# Patient Record
Sex: MALE | Race: WHITE | NOT HISPANIC OR LATINO | Employment: FULL TIME | URBAN - METROPOLITAN AREA
[De-identification: names, ages, dates, MRNs, and addresses within clinical notes are randomized per-mention and may not be internally consistent; named-entity substitution may affect disease eponyms.]

---

## 2022-08-05 ENCOUNTER — HOSPITAL ENCOUNTER (EMERGENCY)
Facility: HOSPITAL | Age: 37
Discharge: HOME | End: 2022-08-05
Attending: EMERGENCY MEDICINE
Payer: COMMERCIAL

## 2022-08-05 VITALS
HEART RATE: 81 BPM | DIASTOLIC BLOOD PRESSURE: 85 MMHG | SYSTOLIC BLOOD PRESSURE: 132 MMHG | OXYGEN SATURATION: 97 % | RESPIRATION RATE: 18 BRPM | TEMPERATURE: 98 F

## 2022-08-05 DIAGNOSIS — S63.502A SPRAIN OF LEFT WRIST, INITIAL ENCOUNTER: Primary | ICD-10-CM

## 2022-08-05 DIAGNOSIS — M25.539 WRIST PAIN: ICD-10-CM

## 2022-08-05 PROCEDURE — 25000003 PHARM REV CODE 250: Performed by: PHYSICIAN ASSISTANT

## 2022-08-05 PROCEDURE — 99284 EMERGENCY DEPT VISIT MOD MDM: CPT

## 2022-08-05 PROCEDURE — 99284 PR EMERGENCY DEPT VISIT,LEVEL IV: ICD-10-PCS | Mod: ,,, | Performed by: PHYSICIAN ASSISTANT

## 2022-08-05 PROCEDURE — 99284 EMERGENCY DEPT VISIT MOD MDM: CPT | Mod: ,,, | Performed by: PHYSICIAN ASSISTANT

## 2022-08-05 RX ORDER — KETOROLAC TROMETHAMINE 30 MG/ML
15 INJECTION, SOLUTION INTRAMUSCULAR; INTRAVENOUS
Status: DISCONTINUED | OUTPATIENT
Start: 2022-08-05 | End: 2022-08-06 | Stop reason: HOSPADM

## 2022-08-05 RX ORDER — NAPROXEN 500 MG/1
500 TABLET ORAL 2 TIMES DAILY PRN
Qty: 30 TABLET | Refills: 0 | Status: SHIPPED | OUTPATIENT
Start: 2022-08-05 | End: 2022-08-10 | Stop reason: SDUPTHER

## 2022-08-05 RX ORDER — ACETAMINOPHEN 500 MG
1000 TABLET ORAL
Status: COMPLETED | OUTPATIENT
Start: 2022-08-05 | End: 2022-08-05

## 2022-08-05 RX ORDER — IBUPROFEN 600 MG/1
600 TABLET ORAL
Status: DISCONTINUED | OUTPATIENT
Start: 2022-08-05 | End: 2022-08-06 | Stop reason: HOSPADM

## 2022-08-05 RX ORDER — KETOROLAC TROMETHAMINE 30 MG/ML
15 INJECTION, SOLUTION INTRAMUSCULAR; INTRAVENOUS
Status: DISCONTINUED | OUTPATIENT
Start: 2022-08-05 | End: 2022-08-05

## 2022-08-05 RX ORDER — HYDROCODONE BITARTRATE AND ACETAMINOPHEN 5; 325 MG/1; MG/1
1 TABLET ORAL
Status: COMPLETED | OUTPATIENT
Start: 2022-08-05 | End: 2022-08-05

## 2022-08-05 RX ADMIN — HYDROCODONE BITARTRATE AND ACETAMINOPHEN 1 TABLET: 5; 325 TABLET ORAL at 10:08

## 2022-08-05 RX ADMIN — ACETAMINOPHEN 1000 MG: 500 TABLET ORAL at 09:08

## 2022-08-05 NOTE — Clinical Note
"Oscar"Clem Tobar was seen and treated in our emergency department on 8/5/2022.  He may return to work on 08/10/2022.  Mr. Tobar likely has an injury to ligaments and tendons in his wrist.  You will need to be evaluated by an orthopedic surgeon to be cleared to return to work.     If you have any questions or concerns, please don't hesitate to call.      Dinorah Montero PA-C"

## 2022-08-06 NOTE — DISCHARGE INSTRUCTIONS
Diagnosis:  Wrist pain    Your x-rays did not show any broken bones.  I think that you may have a tendon or ligament injury given her significant difficulty moving the wrist.  You were given a wrist splint in the emergency department.  You should rest, ice, compress and elevate your wrist.  I am prescribing an anti-inflammatory medicine that you can take as needed for pain. You should take Tylenol in addition to this as needed for pain up to 3 grams daily which is 6 of the 500 mg extra strength tablets.    I sent a referral to our orthopedic surgery doctors for follow-up.  Please call to schedule follow-up appointment.  You will need to be cleared to return to work by an orthopedist or by your primary care doctor.  Insert have any worsening symptoms, please return to the emergency department.

## 2022-08-06 NOTE — ED TRIAGE NOTES
Pt here for wrist injury. Pt stated he was at work when he felt a pop in his wrist. Pt hasn't been able to move wrist since. Able to move fingers.

## 2022-08-09 ENCOUNTER — TELEPHONE (OUTPATIENT)
Dept: ORTHOPEDICS | Facility: CLINIC | Age: 37
End: 2022-08-09

## 2022-08-09 NOTE — TELEPHONE ENCOUNTER
Called pt verify coverage pt states he's Workers comp and was referred to the hand clinic by the ED informed pt that he will have to see [employer] before being seen in clinic gave pt [employer] info notified my supervisor about pt.

## 2022-08-09 NOTE — TELEPHONE ENCOUNTER
Left voicemail regarding rescheduling of appointment with appropriate provider. Requested a call back if needed.

## 2022-08-10 ENCOUNTER — OFFICE VISIT (OUTPATIENT)
Dept: URGENT CARE | Facility: CLINIC | Age: 37
End: 2022-08-10
Payer: COMMERCIAL

## 2022-08-10 VITALS
OXYGEN SATURATION: 97 % | HEIGHT: 66 IN | SYSTOLIC BLOOD PRESSURE: 115 MMHG | TEMPERATURE: 99 F | DIASTOLIC BLOOD PRESSURE: 73 MMHG | HEART RATE: 70 BPM | WEIGHT: 151 LBS | BODY MASS INDEX: 24.27 KG/M2

## 2022-08-10 DIAGNOSIS — S63.502A LEFT WRIST SPRAIN, INITIAL ENCOUNTER: Primary | ICD-10-CM
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
## 2022-08-10 DIAGNOSIS — Z02.6 ENCOUNTER RELATED TO WORKER'S COMPENSATION CLAIM: ICD-10-CM

## 2022-08-10 PROCEDURE — 99203 PR OFFICE/OUTPT VISIT, NEW, LEVL III, 30-44 MIN: ICD-10-PCS | Mod: S$GLB,,, | Performed by: PHYSICIAN ASSISTANT

## 2022-08-10 PROCEDURE — 99203 OFFICE O/P NEW LOW 30 MIN: CPT | Mod: S$GLB,,, | Performed by: PHYSICIAN ASSISTANT

## 2022-08-10 RX ORDER — NAPROXEN 500 MG/1
500 TABLET ORAL 2 TIMES DAILY PRN
Qty: 30 TABLET | Refills: 0 | Status: SHIPPED | OUTPATIENT
Start: 2022-08-10

## 2022-08-10 RX ORDER — ACETAMINOPHEN 500 MG
1000 TABLET ORAL EVERY 6 HOURS PRN
Qty: 60 TABLET | Refills: 0 | Status: SHIPPED | OUTPATIENT
Start: 2022-08-10 | End: 2023-08-10

## 2022-08-10 NOTE — LETTER
[facility]  [address] [address]  Phone: [phone]  Fax: [phone]  [facility]: [id number]    Pt Name: Oscar Santoro Date: 08/05/2022   Employee ID: [id number] Date of First Treatment: 08/10/2022   Company: [employer]       Appointment Time: 02:30 PM Arrived: 2:16 PM   Provider: Sukhwinder Denis PA-C Time Out: 4:05 PM     Office Treatment:   1. Left wrist sprain, initial encounter    2. Encounter related to worker's compensation claim      Medications Ordered This Encounter   Medications    acetaminophen (TYLENOL EXTRA STRENGTH) 500 MG tablet    naproxen (NAPROSYN) 500 MG tablet      Patient Instructions: Use splint as directed, Apply ice 24-48 hours then apply heat/warm soaks, MRI to be scheduled once authorized      Restrictions: Home today (No use of left hand/arm. May begin restricted duty 8/12/2022)     Return Appointment: 8/16/2022 at 9:15 AM TERESSA

## 2022-08-10 NOTE — PROGRESS NOTES
Subjective:       Patient ID: Oscar Tobar is a 36 y.o. male.    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 On Friday he was asked to move a piece of Dure deck that was caked in mud. He normally is given  to move it but they were not available to him at the time of the injury. He tried to move the Duradeck and felt a pop in the Left wrist. He leaned down to do another one and was advised to see the medic. He was sent to [facility] and X-rays were taken. They determined he did not break anything and he was given a brace to wear. The patient has complaints of a throbbing sensation that is also sharp at times deep inside the wrist that travels up to the shoulder. He also complains it feels numbness in the middle two fingers. He was prescribed Naproxen but states it did not help He wears the brace daily. Current pain score 8/10. Patient is RHD. LRC       Constitution: Positive for activity change.   HENT: Negative.    Neck: neck negative.   Cardiovascular: Negative.    Eyes: Negative.    Respiratory: Negative.    Gastrointestinal: Negative.    Endocrine: negative.   Genitourinary: Negative.    Musculoskeletal: Positive for pain, joint pain, joint swelling, abnormal ROM of joint and muscle ache.   Skin: Negative.    Allergic/Immunologic: Negative.    Neurological: Positive for numbness and tingling.   Hematologic/Lymphatic: Negative.    Psychiatric/Behavioral: Negative.         Objective:      Physical Exam  Vitals and nursing note reviewed.   Constitutional:       General: He is not in acute distress.     Appearance: He is well-developed. He is not diaphoretic.   HENT:      Head: Normocephalic and atraumatic.      Right Ear: Hearing and external ear normal.      Left Ear: Hearing and external ear normal.      Nose: Nose normal. No nasal deformity.   Eyes:      General: Lids are normal. No scleral  icterus.     Conjunctiva/sclera: Conjunctivae normal.   Neck:      Trachea: Trachea normal.   Cardiovascular:      Pulses: Normal pulses.   Pulmonary:      Effort: Pulmonary effort is normal. No respiratory distress.      Breath sounds: No stridor.   Musculoskeletal:      Left shoulder: Tenderness (Anterior aspect) present. Decreased range of motion ( AROM:  Flexion 90°, abduction 80° unable to supinate arm to continue abduction range of motion due to wrist pain.). Normal pulse.      Left wrist: Swelling, tenderness (Dorsal>volar) and bony tenderness present. Decreased range of motion (  AROM flexion 0° extension 5°). Normal pulse.      Left hand: Tenderness present. Decreased range of motion. Decreased strength of finger abduction, thumb/finger opposition and wrist extension. Normal sensation. Normal capillary refill. Normal pulse.      Cervical back: Normal range of motion.   Skin:     General: Skin is warm and dry.      Capillary Refill: Capillary refill takes less than 2 seconds.   Neurological:      Mental Status: He is alert. He is not disoriented.      GCS: GCS eye subscore is 4. GCS verbal subscore is 5. GCS motor subscore is 6.      Sensory: No sensory deficit.   Psychiatric:         Attention and Perception: He is attentive.         Speech: Speech normal.         Behavior: Behavior normal.           X-Ray Wrist Complete Left    Result Date: 8/5/2022  EXAMINATION: XR HAND COMPLETE 3 VIEW LEFT; XR WRIST COMPLETE 3 VIEWS LEFT CLINICAL HISTORY: wrist pain; Pain in unspecified wrist TECHNIQUE: PA, lateral, and oblique views of the right hand and right wrist were performed. COMPARISON: None FINDINGS: No fracture or dislocation.  No periarticular osteopenia or erosion.  Cartilage spaces are maintained.  Soft tissues are unremarkable.     No acute fracture or dislocation in the right hand or wrist. Electronically signed by resident: Lexii Herrmann Date:    08/05/2022 Time:    22:08 Electronically signed by: Luis  Ruiz Date:    08/05/2022 Time:    22:18    X-Ray Hand 3 View Left    Result Date: 8/5/2022  EXAMINATION: XR HAND COMPLETE 3 VIEW LEFT; XR WRIST COMPLETE 3 VIEWS LEFT CLINICAL HISTORY: wrist pain; Pain in unspecified wrist TECHNIQUE: PA, lateral, and oblique views of the right hand and right wrist were performed. COMPARISON: None FINDINGS: No fracture or dislocation.  No periarticular osteopenia or erosion.  Cartilage spaces are maintained.  Soft tissues are unremarkable.     No acute fracture or dislocation in the right hand or wrist. Electronically signed by resident: Lexii Herrmann Date:    08/05/2022 Time:    22:08 Electronically signed by: Luis Melchor Date:    08/05/2022 Time:    22:18    Assessment:       1. Left wrist sprain, initial encounter    2. Encounter related to worker's compensation claim        Plan:         LEFT WRIST WITH SEVERE DEFICITS IN RANGE OF MOTION AND STRENGTH.  SWOLLEN AND TENDER.  PATIENT REPORTS FEELING A POPPING SENSATION WITH ANY TYPE OF MOVEMENT.  HE ALSO REPORTS NUMBNESS AND TINGLING IN THE MEDIAN NERVE DISTRIBUTION.  HE REPORTS PAIN RADIATES TO THE LEFT SHOULDER.  WITH THESE DEFICITS I FEEL IT IS NECESSARY TO GET AN MRI AS PATIENT HAS PROBABLE LIGAMENTOUS AND/OR TENDON INJURY.  HIS X-RAY SHOWED NO FRACTURES.  I WOULD NOT FEEL COMFORTABLE SENDING HIM TO OCCUPATIONAL THERAPY AT THIS TIME DUE TO HIS SEVERE PAIN AND DEFICITS.     Medications Ordered This Encounter   Medications    acetaminophen (TYLENOL EXTRA STRENGTH) 500 MG tablet     Sig: Take 2 tablets (1,000 mg total) by mouth every 6 (six) hours as needed for Pain.     Dispense:  60 tablet     Refill:  0    naproxen (NAPROSYN) 500 MG tablet     Sig: Take 1 tablet (500 mg total) by mouth 2 (two) times daily as needed (pain).     Dispense:  30 tablet     Refill:  0     Patient Instructions: Use splint as directed, Apply ice 24-48 hours then apply heat/warm soaks, MRI to be scheduled once authorized   Restrictions: Home  today (No use of left hand/arm. May begin restricted duty 8/12/2022)  Follow up in about 1 week (around 8/17/2022).

## 2022-08-16 ENCOUNTER — OFFICE VISIT (OUTPATIENT)
Dept: URGENT CARE | Facility: CLINIC | Age: 37
End: 2022-08-16
Payer: COMMERCIAL

## 2022-08-16 VITALS
BODY MASS INDEX: 24.27 KG/M2 | OXYGEN SATURATION: 95 % | HEIGHT: 66 IN | SYSTOLIC BLOOD PRESSURE: 120 MMHG | DIASTOLIC BLOOD PRESSURE: 78 MMHG | TEMPERATURE: 98 F | HEART RATE: 65 BPM | WEIGHT: 151 LBS

## 2022-08-16 DIAGNOSIS — Z02.6 ENCOUNTER RELATED TO WORKER'S COMPENSATION CLAIM: Primary | ICD-10-CM

## 2022-08-16 DIAGNOSIS — S66.912D STRAIN OF LEFT WRIST, SUBSEQUENT ENCOUNTER: ICD-10-CM

## 2022-08-16 PROCEDURE — 99214 OFFICE O/P EST MOD 30 MIN: CPT | Mod: S$GLB,,,

## 2022-08-16 PROCEDURE — 99214 PR OFFICE/OUTPT VISIT, EST, LEVL IV, 30-39 MIN: ICD-10-PCS | Mod: S$GLB,,,

## 2022-08-16 RX ORDER — TIZANIDINE 4 MG/1
4 TABLET ORAL 3 TIMES DAILY PRN
Qty: 21 TABLET | Refills: 0 | Status: SHIPPED | OUTPATIENT
Start: 2022-08-16

## 2022-08-16 NOTE — PROGRESS NOTES
Subjective:       Patient ID: Oscar Tobar is a 36 y.o. male.    Chief Complaint: Wrist Injury (Left )    See MA note.  Despite the prescribed medications are in his last visit, he states pain is unchanged.  He is feeling more tightness and discomfort in to his left shoulder and upper body.  He has been wearing the brace consistently but continues to have pain mainly located in the left mid wrist region.  As a result of the pain he feels reluctant to move his left fingers and wrist.    WC, New Visit (DOI 8/5/22) Patient works for [employer] and is for a follow up visit on the injury to his Left Wrist. He is still having a lot of pain and is unable to flex it or picks up things. Poor ROM. Patient reports the pain is worst when he tries to  and describes a shooting pain. He has tried taking the Tylenol and gets little or no relief. He also applies hot and cold compresses. He wears the brace daily. He takes it off but had to sleep with it on last night after he twisted it in his sleep. Current pain score 8/10. Patient is RHD. LRC       Constitution: Positive for activity change.   HENT: Negative.    Neck: neck negative.   Cardiovascular: Negative.    Eyes: Negative.    Respiratory: Negative.    Gastrointestinal: Negative.    Endocrine: negative.   Genitourinary: Negative.    Musculoskeletal: Positive for pain, trauma and abnormal ROM of joint.   Skin: Negative for color change.   Allergic/Immunologic: Negative.    Neurological: Negative.    Hematologic/Lymphatic: Negative.         Objective:      Physical Exam  Vitals and nursing note reviewed.   Constitutional:       General: He is in acute distress.   HENT:      Head: Normocephalic.   Eyes:      Conjunctiva/sclera: Conjunctivae normal.   Musculoskeletal:      Left elbow: No swelling or deformity. Decreased range of motion.      Left forearm: No swelling.      Left wrist: No swelling or deformity. Decreased range of motion. Normal pulse.      Left hand:  Tenderness present. No swelling or deformity. Decreased range of motion. Decreased sensation. Normal pulse.      Cervical back: Normal range of motion.      Comments: No gross deformity noted to the left hand or wrist area.  He does have extensive tattoos on his arms but visually I do not identify any deformity or swelling when compared to the right upper extremity.  He is reluctant to move his left fingers, wrist, or elbow out of concern for pain.   Skin:     General: Skin is warm.      Capillary Refill: Capillary refill takes less than 2 seconds.   Neurological:      Mental Status: He is alert and oriented to person, place, and time.      Sensory: Sensory deficit present.      Gait: Gait is intact.      Comments: Decreased sensation to light touch to the left fingers and hand.   Psychiatric:         Attention and Perception: Attention normal.         Mood and Affect: Mood normal. Affect is flat.         Speech: Speech normal.         Behavior: Behavior normal. Behavior is cooperative.         Assessment:       1. Encounter related to worker's compensation claim    2. Strain of left wrist, subsequent encounter        Plan:         Medications Ordered This Encounter   Medications    tiZANidine (ZANAFLEX) 4 MG tablet     Sig: Take 1 tablet (4 mg total) by mouth 3 (three) times daily as needed.     Dispense:  21 tablet     Refill:  0   All follow-up with our C team to check on the status of the MRI order.  In the interim I have asked him to continue the use of the medication had previously been prescribed.  I will add Zanaflex to help with any muscle spasm/pain.  Continue current work limitations in reassess in 1 week      Restrictions:  (No use of left arm/head; Medication may cause drowsiness)  Follow up in about 1 week (around 8/23/2022).

## 2022-08-16 NOTE — LETTER
[facility]  [address] [address]  Phone: [phone]  Fax: [phone]  [facility]: [id number]    Pt Name: Oscar Santoro Date: 08/05/2022   Employee ID: [id number] Date of Treatment: 08/16/2022   Company: [employer]       Appointment Time: 09:30 AM Arrived: 8:20 AM   Provider: Hemanth Smith, DO Time Out: 10:45 AM      Office Treatment:   1. Encounter related to worker's compensation claim    2. Strain of left wrist, subsequent encounter      Medications Ordered This Encounter   Medications    tiZANidine (ZANAFLEX) 4 MG tablet           Restrictions:  (No use of left arm/head; Medication may cause drowsiness)     Return Appointment: 8/23/2022 at 9:45 AM TERESSA

## 2022-08-23 ENCOUNTER — OFFICE VISIT (OUTPATIENT)
Dept: URGENT CARE | Facility: CLINIC | Age: 37
End: 2022-08-23
Payer: COMMERCIAL

## 2022-08-23 VITALS
OXYGEN SATURATION: 100 % | HEIGHT: 66 IN | SYSTOLIC BLOOD PRESSURE: 132 MMHG | BODY MASS INDEX: 24.27 KG/M2 | DIASTOLIC BLOOD PRESSURE: 83 MMHG | WEIGHT: 151 LBS | HEART RATE: 69 BPM | TEMPERATURE: 98 F

## 2022-08-23 DIAGNOSIS — S66.912D STRAIN OF LEFT WRIST, SUBSEQUENT ENCOUNTER: Primary | ICD-10-CM

## 2022-08-23 DIAGNOSIS — R20.2 PARESTHESIAS: ICD-10-CM

## 2022-08-23 PROCEDURE — 99214 PR OFFICE/OUTPT VISIT, EST, LEVL IV, 30-39 MIN: ICD-10-PCS | Mod: S$GLB,,, | Performed by: EMERGENCY MEDICINE

## 2022-08-23 PROCEDURE — 99214 OFFICE O/P EST MOD 30 MIN: CPT | Mod: S$GLB,,, | Performed by: EMERGENCY MEDICINE

## 2022-08-23 NOTE — LETTER
[facility]  [address] [address]  Phone: [phone]  Fax: [phone]  [facility]: [id number]    Pt Name: Oscar Santoro Date: 08/05/2022   Employee ID:  Date of Treatment: 08/23/2022   Company: [id number]       Appointment Time: 09:30 AM Arrived: 09:34   Provider: Alberto Delarosa MD Time Out:10:44     Office Treatment:   1. Strain of left wrist, subsequent encounter    2. Paresthesias          Patient Instructions: Attention not to aggravate affected area, Use splint as directed    Restrictions: Limited use of left hand and arm (FURTHER RESTRICTIONS FROM ORTHOPEDIST. APPOINTMENT TODAY AT 2 PM)

## 2022-08-23 NOTE — PROGRESS NOTES
Subjective:       Patient ID: Oscar Tobar is a 36 y.o. male.    Chief Complaint: Wrist Injury (Left )    WC, RV (DOI 8/5/22) Patient works for [employer] and is for a follow up visit on the injury to his Left Wrist. He is still having a lot of pain and is unable to  anything without pain. He states it always feels puffy but no change in color. He stats the two middle fingers are numb in the tips. Patient wears the brace most of time. He also states the medication makes him loopy but he still takes it. He advises he has an appt today with a specialist and does not know how to proceeds with OOH Drs. He is getting discouraged because the injury is keeping him from performing routine task and he is no able to work out. Current pain score 7/10. Patient is RHD. [facility]     PATIENT VOICES FRUSTRATION WITH HAVING SEEN DIFFERENT PROVIDERS ON DIFFERENT DAYS AND ALSO THAT HE HAS BEEN DIRECTED BY HIS  TO SEE IN ORTHOPEDICS AT 2:00 P.M. TODAY FOR HIS LEFT WRIST PAIN AND SOME TINGLING TO THE FINGERS.  IT IS RESISTANT TO ALLOW PHYSICAL EXAM SECONDARY TO PAIN.  HE ALREADY DOES HAVE ORTHOPEDICS ESTABLISHED AND I INFORMED HIM THAT HE IS WELCOME TO COME BACK IF THERE ARE ANY ISSUES WITH WORKER'S COMPENSATION COVERAGE AT THAT SPECIALIST APPOINTMENT HOWEVER THAT HE WOULD BE DISCHARGED FROM OCCUPATIONAL HEALTH TO THE CARE OF ORTHOPEDICS.  I HAVE REVIEWED X-RAYS WHICH WERE NEGATIVE FOR ACUTE BONY INJURY.  MRI IS APPROVED AND IS SCHEDULED FOR SEPTEMBER 2ND AT 6:30 P.M. AT [facility] AND HE WILL HAVE THAT PROCEDURE AND FOLLOW-UP RESULTS WITH HIS ORTHOPEDIC SPECIALIST.  I AGAIN INFORMED HIM THAT HE IS WELCOME TO RETURN HOWEVER HE IS NOT INTERESTED IN FOLLOWING UP HERE AND WOULD LIKE TO FOLLOW UP WITH ORTHOPEDIC PHYSICIAN.    ROS    Constitution: Negative.   HENT: Negative.    Neck: neck negative.   Cardiovascular: Negative.    Eyes: Negative.    Respiratory: Negative.    Gastrointestinal: Negative.    Endocrine: negative.    Genitourinary: Negative.    Musculoskeletal: Positive for pain, abnormal ROM of joint and muscle ache.   Skin: Negative.    Allergic/Immunologic: Negative.    Neurological: Positive for numbness and tingling.        Objective:      Physical Exam  Vitals and nursing note reviewed.   Constitutional:       General: He is in acute distress.   HENT:      Head: Normocephalic.   Eyes:      Conjunctiva/sclera: Conjunctivae normal.   Musculoskeletal:      Left elbow: No swelling or deformity. Decreased range of motion.      Left forearm: No swelling.      Left wrist: No swelling or deformity. Decreased range of motion. Normal pulse.      Left hand: Tenderness present. No swelling or deformity. Decreased range of motion. Decreased sensation. Normal pulse.      Cervical back: Normal range of motion.      Comments: No gross deformity noted to the left hand or wrist area.  He does have extensive tattoos on his arms but visually I do not identify any deformity or swelling when compared to the right upper extremity.  He is reluctant to move his left fingers, wrist, or elbow out of concern for pain.   Skin:     General: Skin is warm.      Capillary Refill: Capillary refill takes less than 2 seconds.   Neurological:      Mental Status: He is alert and oriented to person, place, and time.      Sensory: Sensory deficit present.      Gait: Gait is intact.      Comments: Decreased sensation to light touch to the left fingers and hand.   Psychiatric:         Attention and Perception: Attention normal.         Mood and Affect: Mood normal. Affect is flat.         Speech: Speech normal.         Behavior: Behavior normal. Behavior is cooperative.       X-Ray Wrist Complete Left    Result Date: 8/5/2022  EXAMINATION: XR HAND COMPLETE 3 VIEW LEFT; XR WRIST COMPLETE 3 VIEWS LEFT CLINICAL HISTORY: wrist pain; Pain in unspecified wrist TECHNIQUE: PA, lateral, and oblique views of the right hand and right wrist were performed. COMPARISON: None  FINDINGS: No fracture or dislocation.  No periarticular osteopenia or erosion.  Cartilage spaces are maintained.  Soft tissues are unremarkable.     No acute fracture or dislocation in the right hand or wrist. Electronically signed by resident: Lexii Herrmann Date:    08/05/2022 Time:    22:08 Electronically signed by: Luis Melchor Date:    08/05/2022 Time:    22:18    X-Ray Hand 3 View Left    Result Date: 8/5/2022  EXAMINATION: XR HAND COMPLETE 3 VIEW LEFT; XR WRIST COMPLETE 3 VIEWS LEFT CLINICAL HISTORY: wrist pain; Pain in unspecified wrist TECHNIQUE: PA, lateral, and oblique views of the right hand and right wrist were performed. COMPARISON: None FINDINGS: No fracture or dislocation.  No periarticular osteopenia or erosion.  Cartilage spaces are maintained.  Soft tissues are unremarkable.     No acute fracture or dislocation in the right hand or wrist. Electronically signed by resident: Lexii Herrmann Date:    08/05/2022 Time:    22:08 Electronically signed by: Luis Melchor Date:    08/05/2022 Time:    22:18    Assessment:       1. Strain of left wrist, subsequent encounter    2. Paresthesias        Plan:       PATIENT VOICES FRUSTRATION WITH HAVING SEEN DIFFERENT PROVIDERS ON DIFFERENT DAYS AND ALSO THAT HE HAS BEEN DIRECTED BY HIS  TO SEE IN ORTHOPEDICS AT 2:00 P.M. TODAY FOR HIS LEFT WRIST PAIN AND SOME TINGLING TO THE FINGERS.  IT IS RESISTANT TO ALLOW PHYSICAL EXAM SECONDARY TO PAIN.  HE ALREADY DOES HAVE ORTHOPEDICS ESTABLISHED AND I INFORMED HIM THAT HE IS WELCOME TO COME BACK IF THERE ARE ANY ISSUES WITH WORKER'S COMPENSATION COVERAGE AT THAT SPECIALIST APPOINTMENT HOWEVER THAT HE WOULD BE DISCHARGED FROM OCCUPATIONAL HEALTH TO THE CARE OF ORTHOPEDICS.  I HAVE REVIEWED X-RAYS WHICH WERE NEGATIVE FOR ACUTE BONY INJURY.  MRI IS APPROVED AND IS SCHEDULED FOR SEPTEMBER 2ND AT 6:30 P.M. AT [facility] AND HE WILL HAVE THAT PROCEDURE AND FOLLOW-UP RESULTS WITH HIS ORTHOPEDIC SPECIALIST.  I AGAIN  INFORMED HIM THAT HE IS WELCOME TO RETURN HOWEVER HE IS NOT INTERESTED IN FOLLOWING UP HERE AND WOULD LIKE TO FOLLOW UP WITH ORTHOPEDIC PHYSICIAN.     Patient Instructions: Attention not to aggravate affected area, Use splint as directed   Restrictions: Limited use of left hand and arm (FURTHER RESTRICTIONS FROM ORTHOPEDIST. APPOINTMENT TODAY AT 2 PM)  Follow up if symptoms worsen or fail to improve, for FOLLOW UP WITH ORTHPEDICS AS PLANNED.

## 2023-09-09 ENCOUNTER — HOSPITAL ENCOUNTER (EMERGENCY)
Facility: HOSPITAL | Age: 38
Discharge: HOME | End: 2023-09-09
Attending: EMERGENCY MEDICINE

## 2023-09-09 VITALS
OXYGEN SATURATION: 98 % | RESPIRATION RATE: 16 BRPM | TEMPERATURE: 99 F | HEART RATE: 98 BPM | BODY MASS INDEX: 25.11 KG/M2 | DIASTOLIC BLOOD PRESSURE: 81 MMHG | SYSTOLIC BLOOD PRESSURE: 122 MMHG | WEIGHT: 160 LBS | HEIGHT: 67 IN

## 2023-09-09 DIAGNOSIS — M25.532 LEFT WRIST PAIN: ICD-10-CM

## 2023-09-09 DIAGNOSIS — M25.532 WRIST PAIN, ACUTE, LEFT: Primary | ICD-10-CM

## 2023-09-09 DIAGNOSIS — S63.502A SPRAIN OF LEFT WRIST, INITIAL ENCOUNTER: ICD-10-CM

## 2023-09-09 PROCEDURE — 25000003 PHARM REV CODE 250: Performed by: PHYSICIAN ASSISTANT

## 2023-09-09 PROCEDURE — 99283 EMERGENCY DEPT VISIT LOW MDM: CPT

## 2023-09-09 PROCEDURE — 29125 APPL SHORT ARM SPLINT STATIC: CPT | Mod: LT

## 2023-09-09 RX ORDER — NAPROXEN 500 MG/1
500 TABLET ORAL
Status: COMPLETED | OUTPATIENT
Start: 2023-09-09 | End: 2023-09-09

## 2023-09-09 RX ORDER — NAPROXEN 500 MG/1
500 TABLET ORAL 2 TIMES DAILY WITH MEALS
Qty: 20 TABLET | Refills: 0 | Status: SHIPPED | OUTPATIENT
Start: 2023-09-09

## 2023-09-09 RX ADMIN — NAPROXEN 500 MG: 500 TABLET ORAL at 12:09

## 2023-09-09 NOTE — ED NOTES
LOC: The patient is awake and alert; oriented x 3 and speaking appropriately.  APPEARANCE: Patient resting comfortably, patient is clean and well groomed  SKIN: warm and dry, normal skin turgor & moist mucus membranes, skin intact, no breakdown noted.  MUSCULOSKELETAL: Patient moving all extremities well, no obvious swelling or deformities noted, left wrist pain .   RESPIRATORY: Airway is open and patent, respirations are spontaneous, normal effort and rate  CARDIAC: Patient has a normal rate, no peripheral edema noted, capillary refill < 3 seconds; No complaints of chest pain   ABDOMEN: Soft and non tender to palpation, no distention noted.

## 2023-09-09 NOTE — ED PROVIDER NOTES
Encounter Date: 9/9/2023       History     Chief Complaint   Patient presents with    Wrist Pain     C/o left wrist injury, states injury setting up stage equipment, c/o pain to wrist, +swelling and tenderness, radial pulse palpable     38 y/o M with no known medical history presents to the ED c/o L wrist pain.  2 hours prior to arrival, was working building a stage when his arm/wrist got stuck and then pulled.  Pain is 8/10, he has not taken any OTC pain medication prior to arrival. He is R hand dominant. History of surgery to the L wrist, unsure what kind of surgery he had though, unsure if hardware in wrist. Denies fever, chills, chest pain, SOB, numbness.     The history is provided by the patient.     Review of patient's allergies indicates:  No Known Allergies  History reviewed. No pertinent past medical history.  Past Surgical History:   Procedure Laterality Date    WRIST SURGERY       No family history on file.  Social History     Tobacco Use    Smoking status: Never    Smokeless tobacco: Never   Substance Use Topics    Alcohol use: Not Currently    Drug use: Never     Review of Systems   Constitutional:  Negative for chills and fever.   Gastrointestinal:  Negative for abdominal pain.   Genitourinary:  Negative for dysuria.   Musculoskeletal:  Positive for arthralgias and myalgias.   Skin:  Negative for rash.   Neurological:  Negative for numbness and headaches.   Psychiatric/Behavioral:  Negative for confusion.        Physical Exam     Initial Vitals [09/09/23 1120]   BP Pulse Resp Temp SpO2   124/89 100 18 98.6 °F (37 °C) 98 %      MAP       --         Physical Exam    Nursing note and vitals reviewed.  Constitutional: He appears well-developed and well-nourished. He is not diaphoretic. No distress.   Musculoskeletal:         General: Tenderness present. No edema.      Left wrist: Tenderness present. No swelling, deformity or effusion. Decreased range of motion. Normal pulse.      Comments: Tenderness to  the L wrist. No tenderness of the L elbow. No swelling. L radial pulse 2+     Neurological: He is oriented to person, place, and time.   Skin: Skin is warm and dry.   Psychiatric: He has a normal mood and affect.         ED Course   Procedures  Labs Reviewed - No data to display       Imaging Results              X-Ray Hand 3 view Left (Final result)  Result time 09/09/23 13:12:57      Final result by Bev Greco MD (09/09/23 13:12:57)                   Impression:      No definite acute process seen.      Electronically signed by: Bev Greco MD  Date:    09/09/2023  Time:    13:12               Narrative:    EXAMINATION:  XR HAND COMPLETE 3 VIEW LEFT    CLINICAL HISTORY:  wrist pain;.    TECHNIQUE:  PA, lateral, and oblique views of the left hand were performed.    COMPARISON:  None    FINDINGS:  Normal alignment, normal mineralization.  No acute fracture seen.  No significant degenerative changes seen.  Postoperative changes identified at the distal ulna, the osteotomy site/lucency is still visible, no prior examination for comparison.  The distal radius appears normal.  Mild soft tissue edema noted at the wrist region.                        Final result by Bev Greco MD (09/09/23 13:12:57)                   Impression:      No definite acute process seen.      Electronically signed by: Bev Greco MD  Date:    09/09/2023  Time:    13:12               Narrative:    EXAMINATION:  XR HAND COMPLETE 3 VIEW LEFT    CLINICAL HISTORY:  wrist pain;.    TECHNIQUE:  PA, lateral, and oblique views of the left hand were performed.    COMPARISON:  None    FINDINGS:  Normal alignment, normal mineralization.  No acute fracture seen.  No significant degenerative changes seen.  Postoperative changes identified at the distal ulna, the osteotomy site/lucency is still visible, no prior examination for comparison.  The distal radius appears normal.  Mild soft tissue edema noted at the wrist  region.                        Final result by Bev Greco MD (09/09/23 13:12:57)                   Impression:      No definite acute process seen.      Electronically signed by: Bev Greco MD  Date:    09/09/2023  Time:    13:12               Narrative:    EXAMINATION:  XR HAND COMPLETE 3 VIEW LEFT    CLINICAL HISTORY:  wrist pain;.    TECHNIQUE:  PA, lateral, and oblique views of the left hand were performed.    COMPARISON:  None    FINDINGS:  Normal alignment, normal mineralization.  No acute fracture seen.  No significant degenerative changes seen.  Postoperative changes identified at the distal ulna, the osteotomy site/lucency is still visible, no prior examination for comparison.  The distal radius appears normal.  Mild soft tissue edema noted at the wrist region.                                       X-Ray Forearm Left (Final result)  Result time 09/09/23 13:13:47      Final result by Chase Knight Jr., MD (09/09/23 13:13:47)                   Impression:      See above      Electronically signed by: Chase Knight MD  Date:    09/09/2023  Time:    13:13               Narrative:    EXAMINATION:  XR FOREARM LEFT    CLINICAL HISTORY:  Pain in left wrist    TECHNIQUE:  AP and lateral views of the left forearm were performed.    COMPARISON:  None    FINDINGS:  Surgical plate and screws fixate the distal ulna.  Hardware appears intact.  No acute fracture.                                       X-Ray Wrist Complete Left (Final result)  Result time 09/09/23 13:11:29      Final result by Bev Greco MD (09/09/23 13:11:29)                   Impression:      Postoperative change, no complications seen.      Electronically signed by: Bev Greco MD  Date:    09/09/2023  Time:    13:11               Narrative:    EXAMINATION:  XR WRIST COMPLETE 3 VIEWS LEFT    CLINICAL HISTORY:  Pain in left wrist    TECHNIQUE:  PA, lateral, and oblique views of the left wrist were  performed.    COMPARISON:  08/05/2022    FINDINGS:  Status post distal ulna osteotomy fixated by a metallic plate and screws, it is a in good alignment.  The hardware is intact.  The osteotomy lucency is still visible, no prior recent examination for comparison.  The carpal bones and remainder of the visualized osseous structures appear normal.  The soft tissues appear normal.                                       Medications   naproxen tablet 500 mg (500 mg Oral Given 9/9/23 1201)     Medical Decision Making  Amount and/or Complexity of Data Reviewed  Radiology: ordered and independent interpretation performed.     Details: Hardware noted, no acute fracture    Risk  Prescription drug management.         APC / Resident Notes:   36 y/o M with no known medical history presents to the ED c/o L wrist pain. VSS. Well appearing. Tenderness noted to the L wrist. Decreased ROM of the wrist secondary to pain. No tenderness to the L elbow. Normal sensation. L radial pulse 2+. DDx includes but is not limited to fracture, dislocation, sprain. Will get xrays.    Xray L wrist and hand independently reviewed - hardware to the distal ulna, no acute fracture.     L wrist placed in wrist splint with thumb spica due to minimal tenderness over the snuff box. I do not feel that he needs any further labs or imaging at this time. Stable for discharge.    He was discharged with a prescription for naproxen.  He will follow up with orthopedics.  Strict ED return precautions given.  All of the patient's questions were answered.  I reviewed the patient's chart and imaging.                        Clinical Impression:   Final diagnoses:  [M25.532] Left wrist pain  [M25.532] Wrist pain, acute, left (Primary)  [S63.502A] Sprain of left wrist, initial encounter        ED Disposition Condition    Discharge Stable          ED Prescriptions       Medication Sig Dispense Start Date End Date Auth. Provider    naproxen (NAPROSYN) 500 MG tablet Take 1  tablet (500 mg total) by mouth 2 (two) times daily with meals. 20 tablet 9/9/2023 -- Rosalinda Thomas PA-C          Follow-up Information       Follow up With Specialties Details Why Contact Info Additional Information    [facility] 5th Fl [facility]   [address], [address]  [facility] [address] [phone] [facility] - Main Building, 5th Floor Please park in [facility] and take Atrium elevator             Rosalinda Thomas PA-C  09/09/23 1825

## 2023-09-09 NOTE — Clinical Note
"Oscar Tobar (Stephen) was seen and treated in our emergency department on 9/9/2023.  He may return with limitations on 09/11/2023.  No heavy lifting. L wrist sprain.      Sincerely,      Rosalinda Thomas PA-C    "

## 2024-01-04 ENCOUNTER — HOSPITAL ENCOUNTER (EMERGENCY)
Facility: HOSPITAL | Age: 39
Discharge: HOME | End: 2024-01-04
Attending: EMERGENCY MEDICINE

## 2024-01-04 VITALS
HEIGHT: 67 IN | WEIGHT: 160 LBS | RESPIRATION RATE: 20 BRPM | TEMPERATURE: 98 F | DIASTOLIC BLOOD PRESSURE: 82 MMHG | HEART RATE: 85 BPM | SYSTOLIC BLOOD PRESSURE: 121 MMHG | BODY MASS INDEX: 25.11 KG/M2 | OXYGEN SATURATION: 96 %

## 2024-01-04 DIAGNOSIS — S93.401A SPRAIN OF RIGHT ANKLE, UNSPECIFIED LIGAMENT, INITIAL ENCOUNTER: Primary | ICD-10-CM

## 2024-01-04 DIAGNOSIS — M25.531 RIGHT WRIST PAIN: ICD-10-CM

## 2024-01-04 DIAGNOSIS — T14.90XA INJURY: ICD-10-CM

## 2024-01-04 PROCEDURE — 25000003 PHARM REV CODE 250: Performed by: PHYSICIAN ASSISTANT

## 2024-01-04 PROCEDURE — 99284 EMERGENCY DEPT VISIT MOD MDM: CPT

## 2024-01-04 RX ORDER — NAPROXEN 500 MG/1
500 TABLET ORAL 2 TIMES DAILY WITH MEALS
Qty: 20 TABLET | Refills: 0 | Status: SHIPPED | OUTPATIENT
Start: 2024-01-04

## 2024-01-04 RX ORDER — ACETAMINOPHEN 500 MG
1000 TABLET ORAL
Status: COMPLETED | OUTPATIENT
Start: 2024-01-04 | End: 2024-01-04

## 2024-01-04 RX ORDER — NAPROXEN 500 MG/1
500 TABLET ORAL
Status: COMPLETED | OUTPATIENT
Start: 2024-01-04 | End: 2024-01-04

## 2024-01-04 RX ADMIN — ACETAMINOPHEN 1000 MG: 500 TABLET ORAL at 02:01

## 2024-01-04 RX ADMIN — NAPROXEN 500 MG: 500 TABLET ORAL at 02:01

## 2024-01-04 NOTE — ED PROVIDER NOTES
Encounter Date: 1/4/2024       History     Chief Complaint   Patient presents with    Ankle Pain     R ankle pain      38-year-old male with no known medical history presents to the ED complaining of right wrist pain and right ankle pain after a fall 2 hours prior to arrival.  States he slipped, twisted his right ankle and fell landing onto his right wrist.  No LOC. the pain has been constant, worse with weight-bearing.  He was icing at home but did not take any over-the-counter pain medication.  He is right-hand dominant.  No past surgical history to the right wrist or right ankle.  Denies fever, chills, chest pain, shortness breath, nausea, numbness, paresthesias.    The history is provided by the patient.     Review of patient's allergies indicates:  No Known Allergies  History reviewed. No pertinent past medical history.  Past Surgical History:   Procedure Laterality Date    WRIST SURGERY       History reviewed. No pertinent family history.  Social History     Tobacco Use    Smoking status: Never    Smokeless tobacco: Never   Substance Use Topics    Alcohol use: Not Currently    Drug use: Never     Review of Systems   Musculoskeletal:  Positive for arthralgias, gait problem and myalgias.       Physical Exam     Initial Vitals [01/04/24 1255]   BP Pulse Resp Temp SpO2   135/75 83 20 98 °F (36.7 °C) 98 %      MAP       --         Physical Exam    Nursing note and vitals reviewed.  Constitutional: He appears well-developed and well-nourished. He is not diaphoretic. No distress.   HENT:   Head: Normocephalic and atraumatic.   Cardiovascular:  Normal rate, regular rhythm and normal heart sounds.     Exam reveals no gallop and no friction rub.       No murmur heard.  Pulmonary/Chest: Breath sounds normal. He has no wheezes. He has no rhonchi. He has no rales.   Abdominal: Abdomen is soft. Bowel sounds are normal. There is no abdominal tenderness. There is no rebound and no guarding.   Musculoskeletal:      Right  elbow: No swelling or deformity. Normal range of motion. No tenderness.      Right forearm: No swelling, tenderness or bony tenderness.      Right wrist: Tenderness present.      Right ankle: No swelling or deformity. Tenderness present. Normal range of motion. Normal pulse.      Comments: Tenderness to the distal ulna. No snuff box tenderness. R radial pulse 2+. Able to pronate and supinate the hand. R DP pulse 2+. No significant swelling. No tenderness over the Achilles tendon. No midline C or T spine tenderness. Muscular tenderness noted to the R neck/upper back.     Neurological: He is alert and oriented to person, place, and time.   Skin: Skin is warm and dry. No rash noted. No erythema.   Psychiatric: He has a normal mood and affect.         ED Course   Procedures  Labs Reviewed - No data to display         Imaging Results              X-Ray Wrist Complete Right (Final result)  Result time 01/04/24 16:06:04      Final result by Chase Knight Jr., MD (01/04/24 16:06:04)                   Impression:      No fracture identified.  Lateral is slightly oblique in projection.  Additional imaging if clinical concern persists.      Electronically signed by: Chase Knight MD  Date:    01/04/2024  Time:    16:06               Narrative:    EXAMINATION:  XR WRIST COMPLETE 3 VIEWS RIGHT    CLINICAL HISTORY:  Injury, unspecified, initial encounter    TECHNIQUE:  PA, lateral, and oblique views of the right wrist were performed.    COMPARISON:  None    FINDINGS:  Bones are fairly well mineralized.  Alignment is satisfactory.  Lateral is slightly oblique in projection.                                       X-Ray Foot Complete Right (Final result)  Result time 01/04/24 16:03:19      Final result by Chase Kinght Jr., MD (01/04/24 16:03:19)                   Impression:      See above      Electronically signed by: Chase Knight MD  Date:    01/04/2024  Time:    16:03               Narrative:    EXAMINATION:  XR FOOT  COMPLETE 3 VIEW RIGHT    CLINICAL HISTORY:  . Injury, unspecified, initial encounter    TECHNIQUE:  AP, lateral, and oblique views of the right foot were performed.    COMPARISON:  None    FINDINGS:  Bones are fairly well mineralized.  Alignment is satisfactory.  No convincing fracture.  Oblique is suboptimal in projection.                                       X-Ray Hand 3 view Right (Final result)  Result time 01/04/24 16:08:14      Final result by Chase Knight Jr., MD (01/04/24 16:08:14)                   Impression:      See above      Electronically signed by: Chase Knight MD  Date:    01/04/2024  Time:    16:08               Narrative:    EXAMINATION:  XR HAND COMPLETE 3 VIEW RIGHT    CLINICAL HISTORY:  fall;    TECHNIQUE:  PA, lateral, and oblique views of the right hand were performed.    COMPARISON:  None    FINDINGS:  Bones are fairly well mineralized.  Alignment is satisfactory.  Lateral is significantly oblique.                                       X-Ray Ankle Complete Right (Final result)  Result time 01/04/24 16:01:19      Final result by Chase Knight Jr., MD (01/04/24 16:01:19)                   Impression:      Soft tissue swelling lateral malleolus.      Electronically signed by: Chase Knight MD  Date:    01/04/2024  Time:    16:01               Narrative:    EXAMINATION:  XR ANKLE COMPLETE 3 VIEW RIGHT    CLINICAL HISTORY:  Injury, unspecified, initial encounter    TECHNIQUE:  AP, lateral, and oblique images of the right ankle were performed.    COMPARISON:  None    FINDINGS:  Bones are fairly well mineralized.  Soft tissue swelling about the lateral malleolus.  No convincing fracture.                                       Medications   acetaminophen tablet 1,000 mg (1,000 mg Oral Given 1/4/24 1457)   naproxen tablet 500 mg (500 mg Oral Given 1/4/24 1457)     Medical Decision Making  Amount and/or Complexity of Data Reviewed  Radiology: ordered.    Risk  OTC drugs.  Prescription drug  management.         APC / Resident Notes:   38-year-old male with no known medical history presents to the ED complaining of right wrist pain and right ankle pain after a fall 2 hours prior to arrival.  Vital signs stable.  Well-appearing.  There is mild tenderness noted to the right distal ulna.  No significant swelling.  Right radial pulse 2 +.  Normal sensation.  No snuffbox tenderness.  Able to pronate and supinate the hand without difficulty.  There is tenderness noted to the right medial ankle without any associated swelling, deformity, erythema.  Right pedal pulse 2 +.  Normal sensation.  There is muscular tenderness noted to the right neck and upper back without any associated midline C-spine tenderness.  Differential diagnosis includes but is not limited to fracture, dislocation, sprain.  Will obtain x-rays for further evaluation.    All xrays independently reviewed.   Xray R wrist, R hand, R ankle and R foot with no acute fracture or dislocation.     I do not feel that he needs any further labs or imaging at this time. Stable for discharge.    R ankle ACE wrapped. He was discharged with a prescription for naproxen.  He will follow up with his PCP and orthopedics.  Strict ED return precautions given.  All of the patient's questions were answered.  I reviewed the patient's chart and imaging.                         Clinical Impression:  Final diagnoses:  [T14.90XA] Injury  [S93.401A] Sprain of right ankle, unspecified ligament, initial encounter (Primary)  [M25.531] Right wrist pain          ED Disposition Condition    Discharge Stable          ED Prescriptions       Medication Sig Dispense Start Date End Date Auth. Provider    naproxen (NAPROSYN) 500 MG tablet Take 1 tablet (500 mg total) by mouth 2 (two) times daily with meals. 20 tablet 1/4/2024 -- Rosalinda Thomas PA-C          Follow-up Information       Follow up With Specialties Details Why Contact Info Additional Information    [facility]  [facility]   [address]  [facility] [address] [phone] [facility] Please park in surface lot and check in at central registration desk    [facility] 5th Fl Orth[facility]   [address], [address]  [facility] [address] [phone] [facility] - Main Building, 5th Floor Please park in [facility] and take Atrium elevator             Rosalinda Thomas PA-C  01/04/24 7241

## 2024-01-04 NOTE — ED NOTES
Slipped and fell on a wet floor today; +head injury but denies LOC; c/o right ankle pain, right flank pain and right wrist pain.

## 2024-01-04 NOTE — FIRST PROVIDER EVALUATION
Emergency Department TeleTriage Encounter Note      CHIEF COMPLAINT    Chief Complaint   Patient presents with    Ankle Pain     R ankle pain        VITAL SIGNS   Initial Vitals [01/04/24 1255]   BP Pulse Resp Temp SpO2   135/75 83 20 98 °F (36.7 °C) 98 %      MAP       --            ALLERGIES    Review of patient's allergies indicates:  No Known Allergies    PROVIDER TRIAGE NOTE  Patient presents with complaint of right ankle and wrist pain after slip and fall a few hours prior to arrival.      Phy:   Constitutional: well nourished, well developed, appearing stated age, NAD        Initial orders will be placed and care will be transferred to an alternate provider when patient is roomed for a full evaluation. Any additional orders and the final disposition will be determined by that provider.        ORDERS  Labs Reviewed   HIV 1 / 2 ANTIBODY   HEPATITIS C ANTIBODY       ED Orders (720h ago, onward)      Start Ordered     Status Ordering Provider    01/04/24 1257 01/04/24 1256  HIV 1/2 Ag/Ab (4th Gen)  STAT         Ordered KARLOS DAVIS    01/04/24 1257 01/04/24 1256  Hepatitis C Antibody  STAT         Ordered KARLOS DAVIS              Virtual Visit Note: The provider triage portion of this emergency department evaluation and documentation was performed via [employer], a HIPAA-compliant telemedicine application, in concert with a tele-presenter in the room. A face to face patient evaluation with one of my colleagues will occur once the patient is placed in an emergency department room.      DISCLAIMER: This note was prepared with [employer] voice recognition transcription software. Garbled syntax, mangled pronouns, and other bizarre constructions may be attributed to that software system.

## 2024-02-28 NOTE — ED TRIAGE NOTES
C/o  left wrist pain .Several hours PTA  Injured it when wrist was caught between two pieces of metal and   wrist was pulled  and he heard something tear.   
electronic